# Patient Record
Sex: FEMALE | Race: WHITE | NOT HISPANIC OR LATINO | URBAN - METROPOLITAN AREA
[De-identification: names, ages, dates, MRNs, and addresses within clinical notes are randomized per-mention and may not be internally consistent; named-entity substitution may affect disease eponyms.]

---

## 2018-02-19 ENCOUNTER — OFFICE VISIT CONVERTED (OUTPATIENT)
Dept: CARDIOLOGY | Facility: CLINIC | Age: 83
End: 2018-02-19
Attending: SPECIALIST

## 2019-01-31 ENCOUNTER — HOSPITAL ENCOUNTER (OUTPATIENT)
Dept: URGENT CARE | Facility: CLINIC | Age: 84
Discharge: HOME | End: 2019-01-31
Attending: EMERGENCY MEDICINE

## 2019-02-01 ENCOUNTER — HOSPITAL ENCOUNTER (OUTPATIENT)
Dept: OTHER | Facility: HOSPITAL | Age: 84
Discharge: HOME | End: 2019-02-01

## 2019-02-27 ENCOUNTER — HOSPITAL ENCOUNTER (OUTPATIENT)
Dept: URGENT CARE | Facility: CLINIC | Age: 84
Discharge: HOME | End: 2019-02-27

## 2019-03-18 ENCOUNTER — HOSPITAL ENCOUNTER (OUTPATIENT)
Dept: OTHER | Facility: HOSPITAL | Age: 84
Discharge: HOME | End: 2019-03-18

## 2019-04-23 ENCOUNTER — OFFICE VISIT CONVERTED (OUTPATIENT)
Dept: CARDIOLOGY | Facility: CLINIC | Age: 84
End: 2019-04-23
Attending: SPECIALIST

## 2019-10-29 ENCOUNTER — CONVERSION ENCOUNTER (OUTPATIENT)
Dept: CARDIOLOGY | Facility: CLINIC | Age: 84
End: 2019-10-29

## 2019-10-29 ENCOUNTER — OFFICE VISIT CONVERTED (OUTPATIENT)
Dept: CARDIOLOGY | Facility: CLINIC | Age: 84
End: 2019-10-29
Attending: SPECIALIST

## 2020-02-29 ENCOUNTER — HOSPITAL ENCOUNTER (OUTPATIENT)
Dept: MRI IMAGING | Facility: HOSPITAL | Age: 85
Discharge: HOME | End: 2020-02-29
Attending: INTERNAL MEDICINE

## 2020-07-21 ENCOUNTER — OFFICE VISIT CONVERTED (OUTPATIENT)
Dept: CARDIOLOGY | Facility: CLINIC | Age: 85
End: 2020-07-21
Attending: SPECIALIST

## 2020-11-20 ENCOUNTER — HOSPITAL ENCOUNTER (OUTPATIENT)
Dept: GENERAL RADIOLOGY | Facility: HOSPITAL | Age: 85
Discharge: HOME | End: 2020-11-20
Attending: INTERNAL MEDICINE

## 2021-05-13 NOTE — PROGRESS NOTES
Progress Note      Patient Name: Radha Tam   Patient ID: [id number]   Sex: Female   YOB: 1927    Primary Care Provider: Mai SANCHEZ   Referring Provider: Shay Rasmussen MD    Visit Date: July 21, 2020    Provider: Shay Rasmussen MD   Location: [facility]   Location Address: [address] [place] [address], [facility]   CONRAD Prabhakar  [id number]   Location Phone: [phone]          Chief Complaint     Atrial fibrillation.  CHF.       History Of Present Illness  Radha Tam is a 89 year old /White female with a history of shortness of breath and CHF. Denies any chest pain.   CURRENT MEDICATIONS: Apixaban 5 mg b.i.d.; aspirin 81 mg daily; docusate sodium 10 mg daily; ergocalciferol 50,000 units weekly; ferrous sulfate 325 mg b.i.d.; folic acid 5 mg daily; furosemide 20 mg Tuesday, Thursday, Saturday, Sunday; hydralazine 10 mg daily; Levemir insulin; isosorbide mononitrate 30 mg daily; levothyroxine 0.15 mg daily; loratadine 10 mg daily; metolazone 2.5 mg Monday, Wednesday, Friday; nebivolol 5 mg daily; omeprazole 20 mg daily; ropinirole 1 mg q. h.s.; sitagliptin 100 mg daily.   PAST MEDICAL HISTORY: Aortic Aneurysm; Breast Cancer; COPD; Chronic Lymphocytic Leukemia; Chronic Diastolic Heart Failure; Diabetes mellitus; GERD; Hyperlipidemia; Hypertension; Paroxysmal Atrial Fibrillation; Restless Leg Syndrome.   FAMILY HISTORY: Positive for diabetes mellitus, hypertension, and heart disease.   PSYCHOSOCIAL HISTORY: Denies alcohol or tobacco use. Admits mood changes and depression.       Review of Systems  · Cardiovascular  o Admits  o : swelling (feet, ankles, hands), shortness of breath while walking or lying flat, chest pain or angina pectoris   o Denies  o : palpitations (fast, fluttering, or skipping beats)  · Respiratory  o Denies  o : chronic or frequent cough, asthma or wheezing      Vitals  Date Time BP Position Site L\R Cuff Size HR RR TEMP (F) WT  HT  BMI kg/m2  BSA m2 O2 Sat HC       07/21/2020 03:28 /88 Sitting    76 - R           07/21/2020 03:28 /100 Sitting    74 - R                 Physical Examination  · Constitutional  o Appearance  o : Awake, alert, cooperative, pleasant.  · Respiratory  o Inspection of Chest  o : No chest wall deformities, moving equal.  o Auscultation of Lungs  o : Good air entry with vesicular breath sounds.  · Cardiovascular  o Heart  o :   § Auscultation of Heart  § : S1 and S2 regular. No S3. No S4.   o Peripheral Vascular System  o :   § Extremities  § : Peripheral pulses were well felt. No edema. No cyanosis.  · Gastrointestinal  o Abdominal Examination  o : No masses or organomegaly noted.          Assessment     ASSESSMENT & PLAN:    1.  Paroxysmal atrial fibrillation, now in sinus rhythm.  Continue Eliquis for stroke prevention.  2.  COPD, on home O2.  Continue bronchodilators.  3.  Essential hypertension/white coat syndrome.  Blood pressure in the 130/70 range at home.  I asked her to       be on a low-salt diet and monitor blood pressure regularly.    4. See me back in 6 months.      Shay Rasmussen MD, FACC  ANNALISA:vm             Electronically Signed by: Rain Stroud-, Other -Author on July 23, 2020 09:38:21 AM  Electronically Co-signed by: Shay Rasmussen MD -Reviewer on July 23, 2020 09:40:29 AM

## 2021-05-15 VITALS
HEIGHT: 65 IN | SYSTOLIC BLOOD PRESSURE: 152 MMHG | DIASTOLIC BLOOD PRESSURE: 56 MMHG | BODY MASS INDEX: 25.66 KG/M2 | HEART RATE: 64 BPM | WEIGHT: 154 LBS

## 2021-05-15 VITALS
BODY MASS INDEX: 28.99 KG/M2 | SYSTOLIC BLOOD PRESSURE: 188 MMHG | DIASTOLIC BLOOD PRESSURE: 90 MMHG | HEIGHT: 65 IN | HEART RATE: 74 BPM | WEIGHT: 174 LBS

## 2021-05-15 VITALS — HEART RATE: 76 BPM | DIASTOLIC BLOOD PRESSURE: 88 MMHG | SYSTOLIC BLOOD PRESSURE: 192 MMHG

## 2021-05-16 VITALS
WEIGHT: 188 LBS | SYSTOLIC BLOOD PRESSURE: 150 MMHG | DIASTOLIC BLOOD PRESSURE: 48 MMHG | HEIGHT: 65 IN | HEART RATE: 76 BPM | BODY MASS INDEX: 31.32 KG/M2